# Patient Record
Sex: FEMALE | Race: BLACK OR AFRICAN AMERICAN | NOT HISPANIC OR LATINO | Employment: STUDENT | URBAN - METROPOLITAN AREA
[De-identification: names, ages, dates, MRNs, and addresses within clinical notes are randomized per-mention and may not be internally consistent; named-entity substitution may affect disease eponyms.]

---

## 2022-12-31 ENCOUNTER — HOSPITAL ENCOUNTER (EMERGENCY)
Facility: HOSPITAL | Age: 22
Discharge: HOME | End: 2022-12-31
Attending: EMERGENCY MEDICINE
Payer: MEDICAID

## 2022-12-31 VITALS
SYSTOLIC BLOOD PRESSURE: 110 MMHG | HEART RATE: 79 BPM | WEIGHT: 168 LBS | TEMPERATURE: 99 F | OXYGEN SATURATION: 98 % | HEIGHT: 68 IN | BODY MASS INDEX: 25.46 KG/M2 | RESPIRATION RATE: 16 BRPM | DIASTOLIC BLOOD PRESSURE: 58 MMHG

## 2022-12-31 DIAGNOSIS — T78.40XA ALLERGIC REACTION, INITIAL ENCOUNTER: ICD-10-CM

## 2022-12-31 DIAGNOSIS — R22.0 FACIAL SWELLING: Primary | ICD-10-CM

## 2022-12-31 DIAGNOSIS — B34.9 VIRAL SYNDROME: ICD-10-CM

## 2022-12-31 LAB
B-HCG UR QL: NEGATIVE
BILIRUBIN, POC UA: NEGATIVE
BLOOD, POC UA: NEGATIVE
CLARITY, POC UA: CLEAR
COLOR, POC UA: YELLOW
CTP QC/QA: YES
GLUCOSE, POC UA: NEGATIVE
KETONES, POC UA: NEGATIVE
LEUKOCYTE EST, POC UA: NEGATIVE
NITRITE, POC UA: NEGATIVE
PH UR STRIP: 5.5 [PH]
POC RAPID STREP A: NEGATIVE
PROTEIN, POC UA: NEGATIVE
SPECIFIC GRAVITY, POC UA: 1.02
UROBILINOGEN, POC UA: 0.2 E.U./DL

## 2022-12-31 PROCEDURE — 63600175 PHARM REV CODE 636 W HCPCS: Mod: ER | Performed by: EMERGENCY MEDICINE

## 2022-12-31 PROCEDURE — 81003 URINALYSIS AUTO W/O SCOPE: CPT | Mod: ER

## 2022-12-31 PROCEDURE — 25000003 PHARM REV CODE 250: Mod: ER | Performed by: EMERGENCY MEDICINE

## 2022-12-31 PROCEDURE — 81025 URINE PREGNANCY TEST: CPT | Mod: ER | Performed by: NURSE PRACTITIONER

## 2022-12-31 PROCEDURE — 96372 THER/PROPH/DIAG INJ SC/IM: CPT | Performed by: EMERGENCY MEDICINE

## 2022-12-31 PROCEDURE — 99284 EMERGENCY DEPT VISIT MOD MDM: CPT | Mod: 25,ER

## 2022-12-31 RX ORDER — FLUTICASONE PROPIONATE 50 MCG
2 SPRAY, SUSPENSION (ML) NASAL 2 TIMES DAILY
Qty: 15 G | Refills: 0 | Status: SHIPPED | OUTPATIENT
Start: 2022-12-31

## 2022-12-31 RX ORDER — PREDNISONE 50 MG/1
50 TABLET ORAL DAILY
Qty: 4 TABLET | Refills: 0 | Status: SHIPPED | OUTPATIENT
Start: 2023-01-01 | End: 2023-01-05

## 2022-12-31 RX ORDER — DEXAMETHASONE SODIUM PHOSPHATE 4 MG/ML
12 INJECTION, SOLUTION INTRA-ARTICULAR; INTRALESIONAL; INTRAMUSCULAR; INTRAVENOUS; SOFT TISSUE
Status: COMPLETED | OUTPATIENT
Start: 2022-12-31 | End: 2022-12-31

## 2022-12-31 RX ORDER — CETIRIZINE HYDROCHLORIDE 10 MG/1
10 TABLET ORAL DAILY
Qty: 30 TABLET | Refills: 0 | Status: SHIPPED | OUTPATIENT
Start: 2022-12-31 | End: 2023-12-31

## 2022-12-31 RX ORDER — DIPHENHYDRAMINE HCL 25 MG
25 CAPSULE ORAL NIGHTLY PRN
Qty: 20 CAPSULE | Refills: 0 | Status: SHIPPED | OUTPATIENT
Start: 2022-12-31

## 2022-12-31 RX ORDER — OLOPATADINE HYDROCHLORIDE 1 MG/ML
1 SOLUTION/ DROPS OPHTHALMIC 2 TIMES DAILY
Qty: 5 ML | Refills: 0 | Status: SHIPPED | OUTPATIENT
Start: 2022-12-31 | End: 2023-12-31

## 2022-12-31 RX ORDER — PROPARACAINE HYDROCHLORIDE 5 MG/ML
1 SOLUTION/ DROPS OPHTHALMIC
Status: COMPLETED | OUTPATIENT
Start: 2022-12-31 | End: 2022-12-31

## 2022-12-31 RX ORDER — KETOROLAC TROMETHAMINE 5 MG/ML
1 SOLUTION OPHTHALMIC 3 TIMES DAILY
Qty: 1.4 ML | Refills: 0 | Status: SHIPPED | OUTPATIENT
Start: 2022-12-31 | End: 2023-01-07

## 2022-12-31 RX ADMIN — FLUORESCEIN SODIUM 2 EACH: 1 STRIP OPHTHALMIC at 02:12

## 2022-12-31 RX ADMIN — DEXAMETHASONE SODIUM PHOSPHATE 12 MG: 4 INJECTION, SOLUTION INTRA-ARTICULAR; INTRALESIONAL; INTRAMUSCULAR; INTRAVENOUS; SOFT TISSUE at 02:12

## 2022-12-31 RX ADMIN — PROPARACAINE HYDROCHLORIDE 1 DROP: 5 SOLUTION/ DROPS OPHTHALMIC at 02:12

## 2022-12-31 NOTE — ED PROVIDER NOTES
Encounter Date: 12/31/2022    SCRIBE #1 NOTE: I, Emma Ortez, am scribing for, and in the presence of,  Doron Sanchez MD. I have scribed the following portions of the note - Other sections scribed: HPI, ROS.     History     Chief Complaint   Patient presents with    Facial Swelling     Swelling to bilat eyes, onset the 24th/25th, has been taking Abx since the 17th, not getting better     Erial Mix is a 22 y.o. female, with seasonal allergies, presents to the ED for evaluation of bilateral eye swelling, irritation, and redness onset 2 weeks ago. Pt reports that she has been evaluated at an urgent care twice and was given antibiotics and eye drops which causes her eyes to burn. She mentions that her symptoms have worsened and are now causing blurry vision. Pt states that she was exposed to her child who was sick 1 month ago, but denies any known allergies. She mentions that she had lashes last week, but removed them when her symptoms progressed.    The history is provided by the patient. No  was used.   Review of patient's allergies indicates:   Allergen Reactions    Pcn [penicillins] Other (See Comments)     Mother states she is allergic herself and that Erial has never taken. Parents requested this be placed as an allergy     No past medical history on file.  No past surgical history on file.  No family history on file.     Review of Systems   Constitutional: Negative.    HENT:  Positive for facial swelling.    Eyes:  Positive for redness, itching and visual disturbance.   Respiratory: Negative.     Cardiovascular: Negative.    Gastrointestinal: Negative.    Genitourinary: Negative.    Musculoskeletal: Negative.    Skin: Negative.    Neurological: Negative.      Physical Exam     Initial Vitals [12/31/22 1211]   BP Pulse Resp Temp SpO2   (!) 147/84 (!) 115 20 99.5 °F (37.5 °C) 98 %      MAP       --         Physical Exam    Nursing note and vitals reviewed.  Constitutional: She appears  well-developed and well-nourished. She is not diaphoretic. She appears distressed (mildly).   HENT:   Head: Normocephalic and atraumatic.   Nose: Nose normal.   Eyes: EOM are normal. Pupils are equal, round, and reactive to light.   Mild periorbital swelling, conjunctival injection with perilimbal sparing, no fluorescein uptake, no Viviana sign noted.  Chemosis bilateral upper and lower eyelids.  Pupils equally round and reactive to light, clear discharge noted bilaterally.   Neck: Neck supple. No JVD present.   Normal range of motion.  Cardiovascular:  Normal rate, regular rhythm, normal heart sounds and intact distal pulses.           Pulmonary/Chest: Breath sounds normal. No stridor. No respiratory distress. She has no wheezes. She has no rales.   Abdominal: Abdomen is soft. Bowel sounds are normal. She exhibits no distension. There is no abdominal tenderness.   Musculoskeletal:         General: No tenderness or edema. Normal range of motion.      Cervical back: Normal range of motion and neck supple.     Neurological: She is alert and oriented to person, place, and time. She has normal strength.   Skin: Skin is warm and dry. Capillary refill takes less than 2 seconds. No rash noted. No erythema.       ED Course   Procedures  Labs Reviewed   POCT URINE PREGNANCY   POCT URINALYSIS W/O SCOPE   POCT URINALYSIS W/O SCOPE   POCT STREP A, RAPID          Imaging Results    None          Medications   proparacaine 0.5 % ophthalmic solution 1 drop (1 drop Both Eyes Given 12/31/22 1411)   dexAMETHasone injection 12 mg (12 mg Intramuscular Given 12/31/22 1411)   fluorescein ophthalmic strip 2 each (2 each Left Eye Given 12/31/22 1410)     Medical Decision Making:   History:   Old Medical Records: I decided to obtain old medical records.  Clinical Tests:   Lab Tests: Ordered and Reviewed       MDM:    22-year-old female with past medical history as noted above presenting with facial swelling, eye drainage.  Physical exam as  noted above.  ED workup with urinalysis and urine pregnancy unremarkable and negative.  Patient presentation consistent with suspected allergic conjunctivitis with recent prolonged treatment with multiple antibiotic regimens.  Will continue patient on symptomatic treatment for allergic conjunctivitis with Decadron, olopatadine drops, topical Toradol, Benadryl/Zyrtec/Flonase.  At this point time based on physical exam evaluation not suspect periorbital cellulitis, orbital cellulitis, facial cellulitis, sepsis, corneal abrasion, corneal ulcer, herpes keratitis, bacterial conjunctivitis or any further surgical or medical emergency.  Discussed diagnosis and further treatment with patient, including f/u.  Return precautions given and all questions answered.  Patient in understanding of plan.  Pt discharged to home improved and stable.       Scribe Attestation:   Scribe #1: I performed the above scribed service and the documentation accurately describes the services I performed. I attest to the accuracy of the note.                 Scribe attestation: I, Doron Sanchez M.D., personally performed the services described in this documentation.  All medical record entries made by the scribe were at my direction and in my presence.  I have reviewed the chart and agree that the record reflects my personal performance and is accurate and complete.   Clinical Impression:   Final diagnoses:  [R22.0] Facial swelling (Primary)  [B34.9] Viral syndrome  [T78.40XA] Allergic reaction, initial encounter        ED Disposition Condition    Discharge Stable          ED Prescriptions       Medication Sig Dispense Start Date End Date Auth. Provider    predniSONE (DELTASONE) 50 MG Tab () Take 1 tablet (50 mg total) by mouth once daily. for 4 days 4 tablet 2023 Doron Sanchez MD    olopatadine (PATANOL) 0.1 % ophthalmic solution Place 1 drop into both eyes 2 (two) times daily. 5 mL 2022 Doron  JEANCARLOS Sanchez MD    ketorolac 0.5% (ACULAR) 0.5 % Drop () Place 1 drop into both eyes 3 (three) times daily. for 7 days 1.4 mL 2022 Doron Sanchez MD    diphenhydrAMINE (BENADRYL) 25 mg capsule Take 1 capsule (25 mg total) by mouth nightly as needed for Itching or Allergies. 20 capsule 2022 -- Doron Sanchez MD    cetirizine (ZYRTEC) 10 MG tablet Take 1 tablet (10 mg total) by mouth once daily. 30 tablet 2022 Doron Sanchez MD    fluticasone propionate (FLONASE) 50 mcg/actuation nasal spray 2 sprays (100 mcg total) by Each Nostril route 2 (two) times a day. 15 g 2022 -- Doron Sanchez MD          Follow-up Information       Follow up With Specialties Details Why Contact [facility] ED Emergency Medicine Go to  If symptoms worsen [address] [facility] [address] [phone]    [facility] Schedule an appointment as soon as possible for a visit   [address] [facility] [address] [phone]    Dk Motta MD Family Medicine Schedule an appointment as soon as possible for a visit   [address] [facility] [address] [phone]               Doron Sanchez MD  01/10/23 [id number]

## 2022-12-31 NOTE — DISCHARGE INSTRUCTIONS
1) Daily prednisone.  2) Finish your azithromycin course.  3) Use twice daily olopatadine drops.    4) Use ketorolac drops 3 times a day.   5) Use flonase nasal spray twice a day.  6) Benadryl at night.  7) Add in daily zyrtec.

## 2023-01-06 ENCOUNTER — TELEPHONE (OUTPATIENT)
Dept: OPHTHALMOLOGY | Facility: CLINIC | Age: 23
End: 2023-01-06
Payer: MEDICAID

## 2023-01-06 ENCOUNTER — TELEPHONE (OUTPATIENT)
Dept: ALLERGY | Facility: CLINIC | Age: 23
End: 2023-01-06
Payer: MEDICAID

## 2023-01-06 NOTE — TELEPHONE ENCOUNTER
----- Message from Xiao Bronson sent at 1/6/2023  1:56 PM CST -----  Contact: pt  Pt is trying to establish care with provider per referral in system. No available appts populated in system for me to assist with scheduling.     Confirmed contact below:  Contact Name:Saniya Cantu  Phone Number: [phone]

## 2023-01-06 NOTE — TELEPHONE ENCOUNTER
Patient scheduled for new patient appt. Patient sent link to sign up for my chart. Verbalized understanding.

## 2023-01-06 NOTE — TELEPHONE ENCOUNTER
----- Message from Zeina Toney sent at 1/6/2023  1:26 PM CST -----  Contact: Saniya @[phone]  Pt went to the ER last week for eye infection and needs a FU appt.  She is still having some issues with her vision.

## 2023-02-13 ENCOUNTER — TELEPHONE (OUTPATIENT)
Dept: ALLERGY | Facility: CLINIC | Age: 23
End: 2023-02-13
Payer: MEDICAID

## 2023-02-13 NOTE — TELEPHONE ENCOUNTER
Patient had a previous appointment and request to be rescheduled, this was successfully done for 03/01/2023 @ 09:00am

## 2023-02-27 ENCOUNTER — TELEPHONE (OUTPATIENT)
Dept: ALLERGY | Facility: CLINIC | Age: 23
End: 2023-02-27
Payer: MEDICAID

## 2023-05-25 ENCOUNTER — OFFICE VISIT (OUTPATIENT)
Dept: URGENT CARE | Facility: CLINIC | Age: 23
End: 2023-05-25
Payer: MEDICAID

## 2023-05-25 VITALS
WEIGHT: 168 LBS | DIASTOLIC BLOOD PRESSURE: 68 MMHG | BODY MASS INDEX: 25.46 KG/M2 | SYSTOLIC BLOOD PRESSURE: 103 MMHG | RESPIRATION RATE: 19 BRPM | HEIGHT: 68 IN | HEART RATE: 82 BPM | OXYGEN SATURATION: 98 % | TEMPERATURE: 98 F

## 2023-05-25 DIAGNOSIS — J30.1 SEASONAL ALLERGIC RHINITIS DUE TO POLLEN: ICD-10-CM

## 2023-05-25 DIAGNOSIS — R05.9 COUGH, UNSPECIFIED TYPE: Primary | ICD-10-CM

## 2023-05-25 DIAGNOSIS — H10.13 ALLERGIC CONJUNCTIVITIS OF BOTH EYES: ICD-10-CM

## 2023-05-25 LAB
CTP QC/QA: YES
SARS-COV-2 AG RESP QL IA.RAPID: NEGATIVE

## 2023-05-25 PROCEDURE — 99203 OFFICE O/P NEW LOW 30 MIN: CPT | Mod: S$GLB,,, | Performed by: FAMILY MEDICINE

## 2023-05-25 PROCEDURE — 99203 PR OFFICE/OUTPT VISIT, NEW, LEVL III, 30-44 MIN: ICD-10-PCS | Mod: S$GLB,,, | Performed by: FAMILY MEDICINE

## 2023-05-25 PROCEDURE — 87811 SARS-COV-2 COVID19 W/OPTIC: CPT | Mod: QW,S$GLB,, | Performed by: FAMILY MEDICINE

## 2023-05-25 PROCEDURE — 87811 SARS CORONAVIRUS 2 ANTIGEN POCT, MANUAL READ: ICD-10-PCS | Mod: QW,S$GLB,, | Performed by: FAMILY MEDICINE

## 2023-05-25 RX ORDER — FLUTICASONE PROPIONATE 50 MCG
1 SPRAY, SUSPENSION (ML) NASAL DAILY
Qty: 18 G | Refills: 3 | Status: SHIPPED | OUTPATIENT
Start: 2023-05-25 | End: 2023-06-24

## 2023-05-25 RX ORDER — VALACYCLOVIR HYDROCHLORIDE 500 MG/1
1 TABLET, FILM COATED ORAL DAILY
COMMUNITY
Start: 2023-04-24 | End: 2024-04-23

## 2023-05-25 RX ORDER — OLOPATADINE HYDROCHLORIDE 1 MG/ML
1 SOLUTION/ DROPS OPHTHALMIC 2 TIMES DAILY
Qty: 5 ML | Refills: 3 | Status: SHIPPED | OUTPATIENT
Start: 2023-05-25 | End: 2024-05-24

## 2023-05-25 RX ORDER — LORATADINE 10 MG/1
10 TABLET ORAL DAILY
Qty: 30 TABLET | Refills: 2 | Status: SHIPPED | OUTPATIENT
Start: 2023-05-25 | End: 2024-05-24

## 2023-05-25 RX ORDER — MOXIFLOXACIN 5 MG/ML
SOLUTION/ DROPS OPHTHALMIC
COMMUNITY
Start: 2022-12-29

## 2023-05-25 RX ORDER — LEVOCETIRIZINE DIHYDROCHLORIDE 5 MG/1
5 TABLET, FILM COATED ORAL
COMMUNITY
Start: 2022-11-30 | End: 2023-11-30

## 2023-05-25 RX ORDER — NORETHINDRONE ACETATE AND ETHINYL ESTRADIOL, ETHINYL ESTRADIOL AND FERROUS FUMARATE 1MG-10(24)
1 KIT ORAL
COMMUNITY
Start: 2023-01-02

## 2023-05-25 NOTE — PROGRESS NOTES
"Subjective:      Patient ID: Saniya Cantu is a 22 y.o. female.    Vitals:  height is 5' 8" (1.727 m) and weight is 76.2 kg (168 lb). Her temperature is 98.3 °F (36.8 °C). Her blood pressure is 103/68 and her pulse is 82. Her respiration is 19 and oxygen saturation is 98%.     Chief Complaint: Sore Throat    Patient presents to the clinic with cough, congestion, sore throat and runny nose x 4 days.also having itchy watery eyes and nasal congestion  Denies fever or chills      Sore Throat   This is a new problem. The current episode started in the past 7 days. The problem has been gradually worsening. Associated symptoms include congestion, coughing, a plugged ear sensation and swollen glands. Pertinent negatives include no hoarse voice. Treatments tried: mucinex, levacitrezine. The treatment provided mild relief.     HENT:  Positive for congestion and sore throat.    Respiratory:  Positive for cough.     Objective:     Physical Exam   Constitutional: She is oriented to person, place, and time. She appears well-developed. She is cooperative.   HENT:   Head: Normocephalic and atraumatic.   Ears:   Right Ear: Hearing, tympanic membrane, external ear and ear canal normal.   Left Ear: Hearing, tympanic membrane, external ear and ear canal normal.   Nose: Rhinorrhea and congestion present. No mucosal edema or nasal deformity. No epistaxis. Right sinus exhibits no maxillary sinus tenderness and no frontal sinus tenderness. Left sinus exhibits no maxillary sinus tenderness and no frontal sinus tenderness.   Mouth/Throat: Uvula is midline, oropharynx is clear and moist and mucous membranes are normal. Mucous membranes are moist. No trismus in the jaw. Normal dentition. No uvula swelling. No oropharyngeal exudate or posterior oropharyngeal erythema. Oropharynx is clear.   Eyes: Conjunctivae and lids are normal.   Neck: Trachea normal and phonation normal. Neck supple.   Cardiovascular: Normal rate, regular rhythm, normal heart " sounds and normal pulses.   Pulmonary/Chest: Effort normal and breath sounds normal.   Abdominal: Normal appearance and bowel sounds are normal. Soft.   Musculoskeletal: Normal range of motion.         General: Normal range of motion.   Neurological: She is alert and oriented to person, place, and time. She exhibits normal muscle tone.   Skin: Skin is warm, dry and intact.   Psychiatric: Her speech is normal and behavior is normal. Judgment and thought content normal.   Nursing note and vitals reviewed.    Assessment:     1. Cough, unspecified type        Plan:       Cough, unspecified type  -     SARS Coronavirus 2 Antigen, POCT Manual Read                 Results for orders placed or performed during the hospital encounter of 12/31/22   POCT urine pregnancy   Result Value Ref Range    POC Preg Test, Ur Negative Negative     Acceptable Yes    POCT URINALYSIS W/O SCOPE   Result Value Ref Range    Glucose, UA Negative     Bilirubin, UA Negative     Ketones, UA Negative     Spec Grav UA 1.025     Blood, UA Negative     PH, UA 5.5     Protein, UA Negative     Urobilinogen, UA 0.2 E.U./dL    Nitrite, UA Negative     Leukocytes, UA Negative     Color, UA Yellow     Clarity, UA Clear    POCT Rapid Strep A   Result Value Ref Range    POC Rapid Strep A negative Positive/Negative

## 2023-07-17 ENCOUNTER — OFFICE VISIT (OUTPATIENT)
Dept: URGENT CARE | Facility: CLINIC | Age: 23
End: 2023-07-17
Payer: MEDICAID

## 2023-07-17 VITALS
BODY MASS INDEX: 25.46 KG/M2 | DIASTOLIC BLOOD PRESSURE: 80 MMHG | RESPIRATION RATE: 20 BRPM | SYSTOLIC BLOOD PRESSURE: 106 MMHG | HEART RATE: 66 BPM | HEIGHT: 68 IN | TEMPERATURE: 98 F | OXYGEN SATURATION: 98 % | WEIGHT: 168 LBS

## 2023-07-17 DIAGNOSIS — J06.9 VIRAL URI: Primary | ICD-10-CM

## 2023-07-17 LAB
CTP QC/QA: YES
SARS-COV-2 AG RESP QL IA.RAPID: NEGATIVE

## 2023-07-17 PROCEDURE — 87811 SARS-COV-2 COVID19 W/OPTIC: CPT | Mod: QW,S$GLB,,

## 2023-07-17 PROCEDURE — 87811 SARS CORONAVIRUS 2 ANTIGEN POCT, MANUAL READ: ICD-10-PCS | Mod: QW,S$GLB,,

## 2023-07-17 PROCEDURE — 99213 PR OFFICE/OUTPT VISIT, EST, LEVL III, 20-29 MIN: ICD-10-PCS | Mod: S$GLB,,,

## 2023-07-17 PROCEDURE — 99213 OFFICE O/P EST LOW 20 MIN: CPT | Mod: S$GLB,,,

## 2023-07-17 RX ORDER — AZELASTINE 1 MG/ML
1 SPRAY, METERED NASAL 2 TIMES DAILY
Qty: 30 ML | Refills: 0 | Status: SHIPPED | OUTPATIENT
Start: 2023-07-17 | End: 2024-07-16

## 2023-07-17 NOTE — PROGRESS NOTES
"Subjective:      Patient ID: Saniya Cantu is a 23 y.o. female.    Vitals:  height is 5' 8" (1.727 m) and weight is 76.2 kg (168 lb). Her temperature is 98.1 °F (36.7 °C). Her blood pressure is 106/80 and her pulse is 66. Her respiration is 20 and oxygen saturation is 98%.     Chief Complaint: Sinus Problem    24yo female pt presents to the clinic with nasal congestion, body aches and chills, headache, and nausea. Pt reports that symptoms started on Friday. Pt denies fever, cough, shortness of breath, wheezing, chest pain or dizziness. Pt has been using Mucinex with no relief.    Sinus Problem  This is a new problem. The current episode started in the past 7 days. The problem has been gradually worsening since onset. There has been no fever. Associated symptoms include congestion and headaches. Treatments tried: mucinex.     HENT:  Positive for congestion.    Neurological:  Positive for headaches.    Objective:     Physical Exam   Constitutional: She is oriented to person, place, and time. She appears well-developed. She is cooperative.  Non-toxic appearance. She does not appear ill. No distress.   HENT:   Head: Normocephalic and atraumatic.   Ears:   Right Ear: Hearing, tympanic membrane, external ear and ear canal normal.   Left Ear: Hearing, tympanic membrane, external ear and ear canal normal.   Nose: Mucosal edema and congestion present. No rhinorrhea. Right sinus exhibits no maxillary sinus tenderness and no frontal sinus tenderness. Left sinus exhibits no maxillary sinus tenderness and no frontal sinus tenderness.   Mouth/Throat: Uvula is midline and mucous membranes are normal. No trismus in the jaw. No uvula swelling. Posterior oropharyngeal erythema present. No oropharyngeal exudate or posterior oropharyngeal edema.   Eyes: Conjunctivae and lids are normal.   Neck: Trachea normal and phonation normal. Neck supple. No edema present. No erythema present.   Cardiovascular: Normal rate, regular rhythm, normal " heart sounds and normal pulses.   Pulmonary/Chest: Effort normal and breath sounds normal. No respiratory distress. She has no decreased breath sounds. She has no wheezes. She has no rhonchi.   Abdominal: Normal appearance.   Musculoskeletal: Normal range of motion.         General: Normal range of motion.   Lymphadenopathy:     She has no cervical adenopathy.   Neurological: She is alert and oriented to person, place, and time. She exhibits normal muscle tone.   Skin: Skin is warm, dry, intact and not diaphoretic.   Psychiatric: Her speech is normal and behavior is normal.   Nursing note and vitals reviewed.  Results for orders placed or performed in visit on 07/17/23   SARS Coronavirus 2 Antigen, POCT Manual Read   Result Value Ref Range    SARS Coronavirus 2 Antigen Negative Negative     Acceptable Yes        Assessment:     1. Viral URI        Plan:       Viral URI  -     SARS Coronavirus 2 Antigen, POCT Manual Read  -     azelastine (ASTELIN) 137 mcg (0.1 %) nasal spray; 1 spray (137 mcg total) by Nasal route 2 (two) times daily.  Dispense: 30 mL; Refill: 0      Pt in no acute distress. Vitals reassuring. Reviewed negative COVID test results in details. Discussed option for influenza testing, deferred. Discussed OTC medications for symptom relief and astelin nasal spray sent. Discussed the importance of further evaluation if symptoms worsen. Patient stated verbal understanding.    Patient Instructions     Patient Instructions   PLEASE READ YOUR DISCHARGE INSTRUCTIONS ENTIRELY AS IT CONTAINS IMPORTANT INFORMATION.     Please drink plenty of fluids.     Please get plenty of rest.     Please return here or go to the Emergency Department for any concerns or worsening of condition.     Please take an over the counter antihistamine medication (allegra/Claritin/Zyrtec) of your choice as directed.     Try an over the counter decongestant like Mucinex D or Sudafed. You buy this behind the pharmacy  counter     If you do have Hypertension or palpitations, it is safe to take Coricidin HBP for relief of sinus symptoms.     If not allergic, please take over the counter Tylenol (Acetaminophen) and/or Motrin (Ibuprofen) as directed for control of pain and/or fever.  Please follow up with your primary care doctor or specialist as needed.     Sore throat recommendations: Warm fluids, warm salt water gargles, throat lozenges, tea, honey, soup, rest, hydration.     Use over the counter flonase: one spray each nostril twice daily OR two sprays each nostril once daily.      If you  smoke, please stop smoking.     Please return or see your primary care doctor if you develop new or worsening symptoms.      Please arrange follow up with your primary medical clinic as soon as possible. You must understand that you've received an Urgent Care treatment only and that you may be released before all of your medical problems are known or treated. You, the patient, will arrange for follow up as instructed. If your symptoms worsen or fail to improve you should go to the Emergency Room.

## 2023-07-17 NOTE — PROGRESS NOTES
Subjective:      Patient ID: Erial Mix is a 23 y.o. female.    Chief Complaint: No chief complaint on file.    HPI  ROS  Objective:     Physical Exam   Assessment:      No diagnosis found.  Plan:                 No follow-ups on file.

## 2024-08-22 ENCOUNTER — HOSPITAL ENCOUNTER (EMERGENCY)
Facility: HOSPITAL | Age: 24
Discharge: HOME | End: 2024-08-22
Attending: EMERGENCY MEDICINE
Payer: MEDICAID

## 2024-08-22 VITALS
HEIGHT: 68 IN | DIASTOLIC BLOOD PRESSURE: 78 MMHG | SYSTOLIC BLOOD PRESSURE: 122 MMHG | WEIGHT: 175 LBS | HEART RATE: 77 BPM | TEMPERATURE: 98 F | BODY MASS INDEX: 26.52 KG/M2 | OXYGEN SATURATION: 98 % | RESPIRATION RATE: 16 BRPM

## 2024-08-22 DIAGNOSIS — L50.9 URTICARIA: ICD-10-CM

## 2024-08-22 DIAGNOSIS — L50.9 HIVES OF UNKNOWN ORIGIN: Primary | ICD-10-CM

## 2024-08-22 DIAGNOSIS — R21 RASH: ICD-10-CM

## 2024-08-22 LAB
B-HCG UR QL: NEGATIVE
CTP QC/QA: YES

## 2024-08-22 PROCEDURE — 63600175 PHARM REV CODE 636 W HCPCS: Mod: ER | Performed by: EMERGENCY MEDICINE

## 2024-08-22 PROCEDURE — 82803 BLOOD GASES ANY COMBINATION: CPT | Mod: ER

## 2024-08-22 PROCEDURE — 81025 URINE PREGNANCY TEST: CPT | Mod: ER | Performed by: EMERGENCY MEDICINE

## 2024-08-22 PROCEDURE — 25000003 PHARM REV CODE 250: Mod: ER | Performed by: EMERGENCY MEDICINE

## 2024-08-22 PROCEDURE — 99283 EMERGENCY DEPT VISIT LOW MDM: CPT | Mod: 25,ER

## 2024-08-22 RX ORDER — FAMOTIDINE 20 MG/1
20 TABLET, FILM COATED ORAL
Status: COMPLETED | OUTPATIENT
Start: 2024-08-22 | End: 2024-08-22

## 2024-08-22 RX ORDER — HYDROCORTISONE 25 MG/G
CREAM TOPICAL 4 TIMES DAILY
Qty: 60 G | Refills: 0 | Status: SHIPPED | OUTPATIENT
Start: 2024-08-22 | End: 2024-09-03

## 2024-08-22 RX ORDER — CETIRIZINE HYDROCHLORIDE 10 MG/1
10 TABLET ORAL
Status: COMPLETED | OUTPATIENT
Start: 2024-08-22 | End: 2024-08-22

## 2024-08-22 RX ORDER — PREDNISONE 20 MG/1
60 TABLET ORAL
Status: COMPLETED | OUTPATIENT
Start: 2024-08-22 | End: 2024-08-22

## 2024-08-22 RX ADMIN — FAMOTIDINE 20 MG: 20 TABLET, FILM COATED ORAL at 12:08

## 2024-08-22 RX ADMIN — CETIRIZINE HYDROCHLORIDE 10 MG: 10 TABLET, FILM COATED ORAL at 12:08

## 2024-08-22 RX ADMIN — PREDNISONE 60 MG: 20 TABLET ORAL at 12:08

## 2024-08-22 NOTE — Clinical Note
"Erial "Erial" Mix was seen and treated in our emergency department on 8/22/2024.  She may return to work on 08/24/2024.       If you have any questions or concerns, please don't hesitate to call.      Hailey Mcclendon, DO"

## 2024-08-22 NOTE — ED PROVIDER NOTES
Encounter Date: 8/22/2024    SCRIBE #1 NOTE: I, Aysha Arenas, am scribing for, and in the presence of,  Hailey Mcclendon DO.       History     Chief Complaint   Patient presents with    Rash     Rash to leg back and and arm since 9pm last night no sob no difficulty swallowing      24 year old female with no pertinent PMHx presents to the ED with a generalized intermittent rash that began around 2100 last night. Reports rash to her neck, throat, trunk, legs, and arms. Reports she ate [employer] for the first time yesterday and used a new tampon brand. Denies any new lotions, soaps, detergents, perfumes or any other products. No other exacerbating or alleviating factors. Denies fever, nausea, vomiting, trouble swallowing, CP, SOB, or other associated symptoms.     The history is provided by the patient. No  was used.     Review of patient's allergies indicates:   Allergen Reactions    Pcn [penicillins] Other (See Comments)     Mother states she is allergic herself and that Erial has never taken. Parents requested this be placed as an allergy     No past medical history on file.  No past surgical history on file.  No family history on file.  Social History     Tobacco Use    Smoking status: Never    Smokeless tobacco: Never   Substance Use Topics    Alcohol use: Not Currently    Drug use: Not Currently     Review of Systems   Constitutional:  Negative for chills and fever.   HENT:  Negative for trouble swallowing.    Eyes:  Negative for photophobia and redness.   Respiratory:  Negative for cough, choking, shortness of breath and wheezing.    Gastrointestinal:  Negative for abdominal pain, diarrhea, nausea and vomiting.   Genitourinary:  Negative for dysuria, frequency and hematuria.   Musculoskeletal:  Negative for myalgias.   Skin:  Positive for rash.   Neurological:  Negative for dizziness, weakness, numbness and headaches.   Psychiatric/Behavioral:  Negative for confusion.        Physical Exam      Initial Vitals [08/22/24 1150]   BP Pulse Resp Temp SpO2   114/74 73 18 98.1 °F (36.7 °C) 100 %      MAP       --         Physical Exam    Nursing note and vitals reviewed.  Constitutional: She appears well-developed and well-nourished.   HENT:   Head: Normocephalic and atraumatic.   Right Ear: External ear normal.   Left Ear: External ear normal.   Nose: Nose normal.   Mouth/Throat: Oropharynx is clear and moist.   Eyes: Conjunctivae are normal.   Neck: Phonation normal. Neck supple.   Normal range of motion.  Cardiovascular:  Normal rate, regular rhythm, normal heart sounds and intact distal pulses.     Exam reveals no gallop and no friction rub.       No murmur heard.  Pulmonary/Chest: Effort normal and breath sounds normal. No stridor. No respiratory distress. She has no wheezes. She has no rhonchi. She has no rales. She exhibits no tenderness.   Abdominal: Abdomen is soft. Bowel sounds are normal. She exhibits no distension. There is no abdominal tenderness. There is no rigidity, no rebound and no guarding.   Musculoskeletal:         General: No tenderness or edema. Normal range of motion.      Cervical back: Normal range of motion and neck supple.     Neurological: She is alert and oriented to person, place, and time. She has normal strength. GCS score is 15. GCS eye subscore is 4. GCS verbal subscore is 5. GCS motor subscore is 6.   Skin: Skin is warm and dry. Capillary refill takes less than 2 seconds. Rash (urticarial rash to her right buttocks, bilateral knees, BLE, back, bra line) noted.   Psychiatric: She has a normal mood and affect. Her behavior is normal.       Patient gave consent to have physical exam performed.      ED Course   Procedures  Labs Reviewed   POCT URINE PREGNANCY       Result Value    POC Preg Test, Ur Negative       Acceptable Yes            Imaging Results    None          Medications   famotidine tablet 20 mg (20 mg Oral Given 8/22/24 1228)   cetirizine tablet 10  mg (10 mg Oral Given 8/22/24 1228)   predniSONE tablet 60 mg (60 mg Oral Given 8/22/24 1228)     Medical Decision Making  Amount and/or Complexity of Data Reviewed  Labs: ordered. Decision-making details documented in ED Course.    Risk  OTC drugs.  Prescription drug management.    Medical Decision Making:    This is an evaluation of a 24 y.o. female that presents to the Emergency Department for   Chief Complaint   Patient presents with    Rash     Rash to leg back and and arm since 9pm last night no sob no difficulty swallowing        The patient is a non-toxic and well appearing patient. On physical exam, patient appears well hydrated with moist mucus membranes. Breath sounds are clear and equal bilaterally with no adventitious breath sounds, tachypnea or respiratory distress. Regular rate and rhythm. No murmurs. Abdomen soft and non tender. Patient is tolerating PO without difficulty. urticarial rash to her right buttocks, bilateral knees, BLE, back, bra line. No respiratory distress. Oropharynx clear. Physical exam otherwise as above.     I have reviewed vital signs and nursing notes.   Vital Signs Are Reassuring.     Based on the patient's symptoms, I am considering and evaluating for the following differential diagnoses: pregnancy, allergic reaction, contact dermatitis, rash    ED Course:Treatment in the ED included Physical Exam and medications given in ED  Medications   famotidine tablet 20 mg (20 mg Oral Given 8/22/24 1228)   cetirizine tablet 10 mg (10 mg Oral Given 8/22/24 1228)   predniSONE tablet 60 mg (60 mg Oral Given 8/22/24 1228)   .   Patient reports feeling better after treatment in the ER.   Vital signs reviewed  Nurse's notes reviewed  External Data/Documents Reviewed: Previous medical records and vital signs reviewed, see HPI and Physical exam.   Labs: ordered and reviewed.  Pregnancy test negative  Risk  Diagnosis or treatment significantly limited by the following social determinants of  health: Body mass index is 26.61 kg/m².     In shared decision making with the patient, we discussed treatment, prescriptions, labs, and imaging results.    Discharge home with   ED Prescriptions       Medication Sig Dispense Start Date End Date Auth. Provider    hydrocortisone 2.5 % cream Apply topically 4 (four) times daily. for 12 days 60 g 8/22/2024 9/3/2024 Hailey Mcclendon DO          Fill and take prescriptions as directed.  Return to the ED if symptoms worsen or do not resolve.   Answered questions and discussed discharge plan.    Patient reports resolution of symptoms and is ready for discharge.  Follow up with PCP/specialist in 1 day    The following labs and imaging were reviewed:      Admission on 08/22/2024, Discharged on 08/22/2024   Component Date Value Ref Range Status    POC Preg Test, Ur 08/22/2024 Negative  Negative Final     Acceptable 08/22/2024 Yes   Final        Imaging Results    None               Scribe Attestation:   Scribe #1: I performed the above scribed service and the documentation accurately describes the services I performed. I attest to the accuracy of the note.                            I, Dr. Hailey Mcclendon, personally performed the services described in this documentation. This document was produced by a scribe under my direction and in my presence. All medical record entries made by the scribe were at my direction and in my presence.  I have reviewed the chart and agree that the record reflects my personal performance and is accurate and complete. Hailey Mcclendon DO.     08/22/2024 2:45 PM      Clinical Impression:  Final diagnoses:  [L50.9] Hives of unknown origin (Primary)  [R21] Rash  [L50.9] Urticaria          ED Disposition Condition    Discharge Stable          ED Prescriptions       Medication Sig Dispense Start Date End Date Auth. Provider    hydrocortisone 2.5 % cream Apply topically 4 (four) times daily. for 12 days 60 g 8/22/2024 9/3/2024 Hakan  DO Hailey          Follow-up Information       Follow up With Specialties Details Why Contact Info Additional Information    [facility] - Allergy/ Immunology Allergy Schedule an appointment as soon as possible for a visit in 1 day  [address] [facility] [address] [phone] 2nd floor    [facility] [place] - Emergency Dept Emergency Medicine Go to  Please go to [facility] emergency department if symptoms worsen [address] [facility] [place] [address] [phone]     Cary Jane MD Internal Medicine, Wound Care Schedule an appointment as soon as possible for a visit  Please establish a primary care physician [address] [place] [address]  [address]  [place] [address] [phone]       [facility] Family Medicine Schedule an appointment as soon as possible for a visit in 1 day Please establish a primary care physician [address] [facility], [address]  [facility] [address] [phone] Please park in [address] or D and use [address]. Take Medical Office Bldg. elevators.             Hailey Mcclendon DO  08/22/24 9631

## 2024-08-26 ENCOUNTER — TELEPHONE (OUTPATIENT)
Dept: ALLERGY | Facility: CLINIC | Age: 24
End: 2024-08-26
Payer: MEDICAID

## 2024-08-26 NOTE — TELEPHONE ENCOUNTER
----- Message from Isaiah Miramontes sent at 8/26/2024 10:18 AM CDT -----  Regarding: appt access  Contact: pt @ [phone]  Pt is needing to be scheduled from a referral for  L50.9 (ICD-10-CM) - Hives of unknown origin. Please call to advise further. Thank you for all you are doing.

## 2024-08-26 NOTE — TELEPHONE ENCOUNTER
Called patient in regards to below message an appointment has been scheduled for 9/26/24.    Pt is needing to be scheduled from a referral for L50.9 (ICD-10-CM) - Hives of unknown origin.

## 2024-09-24 ENCOUNTER — LAB VISIT (OUTPATIENT)
Dept: LAB | Facility: HOSPITAL | Age: 24
End: 2024-09-24
Payer: MEDICAID

## 2024-09-24 ENCOUNTER — OFFICE VISIT (OUTPATIENT)
Dept: ALLERGY | Facility: CLINIC | Age: 24
End: 2024-09-24
Payer: MEDICAID

## 2024-09-24 VITALS — HEIGHT: 69 IN | BODY MASS INDEX: 26.09 KG/M2 | WEIGHT: 176.13 LBS

## 2024-09-24 DIAGNOSIS — J31.0 CHRONIC RHINITIS: Primary | ICD-10-CM

## 2024-09-24 DIAGNOSIS — J31.0 CHRONIC RHINITIS: ICD-10-CM

## 2024-09-24 DIAGNOSIS — L50.8 ACUTE URTICARIA: ICD-10-CM

## 2024-09-24 DIAGNOSIS — Z88.0 PENICILLIN ALLERGY: ICD-10-CM

## 2024-09-24 PROCEDURE — 86003 ALLG SPEC IGE CRUDE XTRC EA: CPT | Mod: 59 | Performed by: STUDENT IN AN ORGANIZED HEALTH CARE EDUCATION/TRAINING PROGRAM

## 2024-09-24 PROCEDURE — 99999 PR PBB SHADOW E&M-EST. PATIENT-LVL III: CPT | Mod: PBBFAC,,, | Performed by: STUDENT IN AN ORGANIZED HEALTH CARE EDUCATION/TRAINING PROGRAM

## 2024-09-24 PROCEDURE — 86003 ALLG SPEC IGE CRUDE XTRC EA: CPT | Performed by: STUDENT IN AN ORGANIZED HEALTH CARE EDUCATION/TRAINING PROGRAM

## 2024-09-24 PROCEDURE — 99213 OFFICE O/P EST LOW 20 MIN: CPT | Mod: PBBFAC | Performed by: STUDENT IN AN ORGANIZED HEALTH CARE EDUCATION/TRAINING PROGRAM

## 2024-09-24 PROCEDURE — 36415 COLL VENOUS BLD VENIPUNCTURE: CPT | Performed by: STUDENT IN AN ORGANIZED HEALTH CARE EDUCATION/TRAINING PROGRAM

## 2024-09-24 RX ORDER — OLOPATADINE HYDROCHLORIDE 1 MG/ML
1 SOLUTION/ DROPS OPHTHALMIC 2 TIMES DAILY PRN
Qty: 5 ML | Refills: 11 | Status: SHIPPED | OUTPATIENT
Start: 2024-09-24 | End: 2025-09-24

## 2024-09-24 NOTE — PROGRESS NOTES
ALLERGY & IMMUNOLOGY CLINIC   HISTORY OF PRESENT ILLNESS   Referral from: Hailey Mcclendon DO (ED)  CC: Hives      HPI: Saniya Cantu is a 24 y.o. female presenting for evaluation of hives.    Urticaria: Hives first started 1 month ago, all over the body, lasted for 2-3 days. Had to go to the emergency room. Given prednisone, famotidine, and cetirizine. Took for several days and hives went away. Have not returned since. Has 3 year old at home, may have had viral illness.     2 years ago had periorbital edema but also reports purulent discharge from her eyes after trying new eyelash glue application.     Rhinitis: Reports seasonal allergies worse in winter since she was a child. Sneezing rhinorrhea, itchy watery eyes. Not triggered by cats or dogs. Sometimes triggered by fragrances.     Uses Flonase and cetirizine prn which helps somewhat.     Asthma/wheeze: Denies  Eczema: Denies    Food Allergy: Denies    Drug allergy: mother and grandmother have penicillin allergy, patient has never had a penicillin.     Pets: No pets at home     FamHx: first degree relatives   Allergies, asthma, atopic dermatitis, immune deficiency, unusual infections: Entire family has AR. 3 year old child w/ AD. Mother and GM w/ penicillin allergy.     Drug Allergies:   Review of patient's allergies indicates:   Allergen Reactions    Pcn [penicillins] Other (See Comments)     Mother states she is allergic herself and that Saniya has never taken. Parents requested this be placed as an allergy       MEDICAL HISTORY   MedHx:   There is no problem list on file for this patient.      SurgHx:  No past surgical history on file.    Medications:   Current Outpatient Medications on File Prior to Visit   Medication Sig Dispense Refill    azelastine (ASTELIN) 137 mcg (0.1 %) nasal spray 1 spray (137 mcg total) by Nasal route 2 (two) times daily. 30 mL 0    cetirizine (ZYRTEC) 10 MG tablet Take 1 tablet (10 mg total) by mouth once daily. (Patient not taking:  "Reported on 7/17/2023) 30 tablet 0    diphenhydrAMINE (BENADRYL) 25 mg capsule Take 1 capsule (25 mg total) by mouth nightly as needed for Itching or Allergies. (Patient not taking: Reported on 5/25/2023) 20 capsule 0    fluticasone propionate (FLONASE) 50 mcg/actuation nasal spray 2 sprays (100 mcg total) by Each Nostril route 2 (two) times a day. 15 g 0    hydrocortisone 2.5 % cream Apply topically 4 (four) times daily. for 12 days 60 g 0    levocetirizine (XYZAL) 5 MG tablet Take 5 mg by mouth.      LO LOESTRIN FE 1 mg-10 mcg (24)/10 mcg (2) Tab Take 1 tablet by mouth.      loratadine (CLARITIN) 10 mg tablet Take 1 tablet (10 mg total) by mouth once daily. (Patient not taking: Reported on 7/17/2023) 30 tablet 2    moxifloxacin (VIGAMOX) 0.5 % ophthalmic solution Place into both eyes.      olopatadine (PATANOL) 0.1 % ophthalmic solution Place 1 drop into both eyes 2 (two) times daily. (Patient not taking: Reported on 7/17/2023) 5 mL 3    valACYclovir (VALTREX) 500 MG tablet Take 1 tablet by mouth once daily.       No current facility-administered medications on file prior to visit.      PHYSICAL EXAM   VS: Ht 5' 9.29" (1.76 m)   Wt 79.9 kg (176 lb 2.4 oz)   BMI 25.79 kg/m²   GENERAL: Alert, NAD, well-appearing, well nourished  EYES: no conjunctival injection, no infraorbital shiners  EARS: external auditory canals normal B/L, TM normal B/L  NOSE: NT pale B/L, no polyps  ORAL: MMM, no ulcers, no thrush, no cobblestoning  NECK: no LAD  LUNGS: CTAB, no w/r/c, no increased WOB  ABDOMEN: soft, non-tender, non-distended, no HSM  DERM: no rashes   ALLERGEN TESTING   No prior     ASSESSMENT & PLAN     Saniya Cantu is a 24 y.o. female with     Chronic rhinitis: Suspect mixed rhinitis (obvious vasomotor/irritant triggers, and allergic symptoms).   - Continue Flonase and Zyrtec (cetirizine) prn  -     Dermatophagoides [place]; Future; Expected date: 09/24/2024  -     Dermatophagoides Pteronyssinus; Future; Expected date: " 09/24/2024  -     [place]; Future; Expected date: 09/24/2024  -     Clif; Future; Expected date: 09/24/2024  -     [place]; Future; Expected date: 09/24/2024  -     English Plantain; Future; Expected date: 09/24/2024  -     Oak; Future; Expected date: 09/24/2024  -     Pecan; Future; Expected date: 09/24/2024  -     Zurita Elder; Future; Expected date: 09/24/2024  -     Ragweed; Future; Expected date: 09/24/2024  -     Alternaria; Future; Expected date: 09/24/2024  -     Aspergillus; Future; Expected date: 09/24/2024  -     Cat; Future; Expected date: 09/24/2024  -     Dog; Future; Expected date: 09/24/2024  -     olopatadine (PATANOL) 0.1 % ophthalmic solution; Place 1 drop into both eyes 2 (two) times daily as needed for Allergies (as needed for itchy watery eyes).  Dispense: 5 mL; Refill: 11    Acute urticaria: Only 1 episode last month, resolved after several days. No need for further workup.  - If hives return can take up to 4 tablets of Zyrtec (cetirizine) per day (take 2 tablets in morning and 2 tablets at night)    Penicillin Allergy: No prior history of reaction. Multiple family members w/ penicillin allergy. Patient prefers an in clinic challenge rather than immediate de labeling.     Follow up: 1 month

## 2024-09-27 LAB
A ALTERNATA IGE QN: <0.1 KU/L
A FUMIGATUS IGE QN: <0.1 KU/L
BERMUDA GRASS IGE QN: 0.15 KU/L
CAT DANDER IGE QN: <0.1 KU/L
CEDAR IGE QN: <0.1 KU/L
D FARINAE IGE QN: <0.1 KU/L
D PTERONYSS IGE QN: <0.1 KU/L
DEPRECATED CEDAR IGE RAST QL: NORMAL
DEPRECATED TIMOTHY IGE RAST QL: ABNORMAL
DOG DANDER IGE QN: <0.1 KU/L
ELDER IGE QN: <0.1 KU/L
ENGL PLANTAIN IGE QN: <0.1 KU/L
PECAN/HICK TREE IGE QN: 0.48 KU/L
RAST CLASS: ABNORMAL
RAST CLASS: ABNORMAL
RAST CLASS: NORMAL
TIMOTHY IGE QN: 0.67 KU/L
WEST RAGWEED IGE QN: <0.1 KU/L
WHITE OAK IGE QN: <0.1 KU/L

## 2024-10-14 ENCOUNTER — HOSPITAL ENCOUNTER (EMERGENCY)
Facility: HOSPITAL | Age: 24
Discharge: HOME | End: 2024-10-14
Attending: EMERGENCY MEDICINE
Payer: MEDICAID

## 2024-10-14 VITALS
WEIGHT: 176 LBS | RESPIRATION RATE: 18 BRPM | SYSTOLIC BLOOD PRESSURE: 99 MMHG | BODY MASS INDEX: 26.07 KG/M2 | DIASTOLIC BLOOD PRESSURE: 54 MMHG | OXYGEN SATURATION: 99 % | HEIGHT: 69 IN | TEMPERATURE: 99 F | HEART RATE: 59 BPM

## 2024-10-14 DIAGNOSIS — S16.1XXA CERVICAL STRAIN, ACUTE, INITIAL ENCOUNTER: ICD-10-CM
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
## 2024-10-14 LAB
B-HCG UR QL: NEGATIVE
CTP QC/QA: YES

## 2024-10-14 PROCEDURE — 25000003 PHARM REV CODE 250: Mod: ER | Performed by: NURSE PRACTITIONER

## 2024-10-14 PROCEDURE — 81025 URINE PREGNANCY TEST: CPT | Mod: ER

## 2024-10-14 PROCEDURE — 81025 URINE PREGNANCY TEST: CPT | Mod: ER | Performed by: NURSE PRACTITIONER

## 2024-10-14 PROCEDURE — 99284 EMERGENCY DEPT VISIT MOD MDM: CPT | Mod: 25,ER

## 2024-10-14 RX ORDER — CYCLOBENZAPRINE HCL 10 MG
10 TABLET ORAL 3 TIMES DAILY PRN
Qty: 15 TABLET | Refills: 0 | Status: SHIPPED | OUTPATIENT
Start: 2024-10-14 | End: 2024-10-19

## 2024-10-14 RX ORDER — DEXTROMETHORPHAN HYDROBROMIDE, GUAIFENESIN 5; 100 MG/5ML; MG/5ML
650 LIQUID ORAL EVERY 8 HOURS
Qty: 20 TABLET | Refills: 0 | Status: SHIPPED | OUTPATIENT
Start: 2024-10-14 | End: 2024-10-21

## 2024-10-14 RX ORDER — LIDOCAINE 50 MG/G
1 PATCH TOPICAL DAILY
Qty: 14 PATCH | Refills: 0 | Status: SHIPPED | OUTPATIENT
Start: 2024-10-14 | End: 2024-10-28

## 2024-10-14 RX ORDER — IBUPROFEN 600 MG/1
600 TABLET ORAL EVERY 6 HOURS PRN
Qty: 20 TABLET | Refills: 0 | Status: SHIPPED | OUTPATIENT
Start: 2024-10-14 | End: 2024-10-19

## 2024-10-14 RX ORDER — LIDOCAINE 50 MG/G
1 PATCH TOPICAL
Status: DISCONTINUED | OUTPATIENT
Start: 2024-10-14 | End: 2024-10-14 | Stop reason: HOSPADM

## 2024-10-14 RX ORDER — IBUPROFEN 600 MG/1
600 TABLET ORAL
Status: COMPLETED | OUTPATIENT
Start: 2024-10-14 | End: 2024-10-14

## 2024-10-14 RX ADMIN — LIDOCAINE 1 PATCH: 50 PATCH TOPICAL at 10:10

## 2024-10-14 RX ADMIN — IBUPROFEN 600 MG: 600 TABLET ORAL at 10:10

## 2024-10-14 NOTE — DISCHARGE INSTRUCTIONS
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
## 2024-10-14 NOTE — Clinical Note
"Erial "Erial" Mix was seen and treated in our emergency department on 10/14/2024.  She may return to work on 10/16/2024.       If you have any questions or concerns, please don't hesitate to call.      Marine Holliday, FNP"